# Patient Record
Sex: FEMALE | Race: WHITE | NOT HISPANIC OR LATINO | Employment: OTHER | URBAN - METROPOLITAN AREA
[De-identification: names, ages, dates, MRNs, and addresses within clinical notes are randomized per-mention and may not be internally consistent; named-entity substitution may affect disease eponyms.]

---

## 2023-04-28 ENCOUNTER — TELEPHONE (OUTPATIENT)
Dept: PRIMARY CARE | Facility: CLINIC | Age: 73
End: 2023-04-28
Payer: MEDICARE

## 2023-04-28 NOTE — TELEPHONE ENCOUNTER
----- Message from Amanad Diaz DO sent at 4/24/2023  9:22 AM EDT -----  Let pt know her mammogram was normal.  Repeat in 1 year.

## 2023-05-15 DIAGNOSIS — K30 FUNCTIONAL DYSPEPSIA: ICD-10-CM

## 2023-05-15 RX ORDER — SIMVASTATIN 10 MG/1
TABLET, FILM COATED ORAL
Qty: 90 TABLET | Refills: 1 | Status: SHIPPED | OUTPATIENT
Start: 2023-05-15 | End: 2023-11-08

## 2023-11-08 DIAGNOSIS — K30 FUNCTIONAL DYSPEPSIA: ICD-10-CM

## 2023-11-08 RX ORDER — SIMVASTATIN 10 MG/1
TABLET, FILM COATED ORAL
Qty: 90 TABLET | Refills: 1 | Status: SHIPPED | OUTPATIENT
Start: 2023-11-08 | End: 2024-04-29

## 2024-01-04 ENCOUNTER — OFFICE VISIT (OUTPATIENT)
Dept: ORTHOPEDIC SURGERY | Facility: CLINIC | Age: 74
End: 2024-01-04
Payer: MEDICARE

## 2024-01-04 DIAGNOSIS — M17.0 ARTHRITIS OF BOTH KNEES: Primary | ICD-10-CM

## 2024-01-04 DIAGNOSIS — M25.562 CHRONIC PAIN OF BOTH KNEES: ICD-10-CM

## 2024-01-04 DIAGNOSIS — M25.561 CHRONIC PAIN OF BOTH KNEES: ICD-10-CM

## 2024-01-04 DIAGNOSIS — G89.29 CHRONIC PAIN OF BOTH KNEES: ICD-10-CM

## 2024-01-04 PROCEDURE — 1160F RVW MEDS BY RX/DR IN RCRD: CPT | Performed by: ORTHOPAEDIC SURGERY

## 2024-01-04 PROCEDURE — 1159F MED LIST DOCD IN RCRD: CPT | Performed by: ORTHOPAEDIC SURGERY

## 2024-01-04 PROCEDURE — 99213 OFFICE O/P EST LOW 20 MIN: CPT | Performed by: ORTHOPAEDIC SURGERY

## 2024-01-04 PROCEDURE — 20610 DRAIN/INJ JOINT/BURSA W/O US: CPT | Performed by: ORTHOPAEDIC SURGERY

## 2024-01-04 PROCEDURE — 1036F TOBACCO NON-USER: CPT | Performed by: ORTHOPAEDIC SURGERY

## 2024-01-04 RX ORDER — LIDOCAINE HYDROCHLORIDE 10 MG/ML
2 INJECTION INFILTRATION; PERINEURAL
Status: COMPLETED | OUTPATIENT
Start: 2024-01-04 | End: 2024-01-04

## 2024-01-04 RX ORDER — TRIAMCINOLONE ACETONIDE 40 MG/ML
40 INJECTION, SUSPENSION INTRA-ARTICULAR; INTRAMUSCULAR
Status: COMPLETED | OUTPATIENT
Start: 2024-01-04 | End: 2024-01-04

## 2024-01-04 RX ADMIN — TRIAMCINOLONE ACETONIDE 40 MG: 40 INJECTION, SUSPENSION INTRA-ARTICULAR; INTRAMUSCULAR at 15:29

## 2024-01-04 RX ADMIN — LIDOCAINE HYDROCHLORIDE 2 ML: 10 INJECTION INFILTRATION; PERINEURAL at 15:29

## 2024-01-04 NOTE — PROGRESS NOTES
Subjective    Patient ID: Makenna Kan is a 73 y.o. female.    Chief Complaint: OTHER (F/U BILATERAL KNEES.)       This is a 73-year-old female who has not been seen in this office in over 6 months.  She is known to suffer from arthritis of both the right and the left knee.  The right knee involves the medial compartment mostly in the left knee the lateral compartment.  Also noted is arthritic change of the patellofemoral joint spaces of both knees.  Previous intra-articular Kenalog injections into each knee in June of last year were of significant benefit for 5 months.  Over the past 1 to 2 months she has noted increasing discomfort.  Stiffness after prolonged sitting.  Pain with functions of ADL.  No new injury.    This patient's past medical, social, and family history were reviewed as well as a review of systems including updates on the patient's information encounter sheet    Physical Examination  Constitutional: Patient's height and weight reviewed, appears well kempt  Psychiatric: Alert and oriented ×3, appropriate mood and behavior  Pulmonary: Breathing appears nonlabored, no apparent distress  Lymphatic: No appreciable lymphadenopathy to both the upper and lower extremities  Skin: No open lesions, rashes, ulcerations  Neurologic: Gross motor and sensory exam appear intact (except for abnormalities noted in the below muscle skeletal exam)    Musculoskeletal: There appears to be satisfactory motion of each hip without groin or thigh pain elicited.  Each knee demonstrates patellofemoral crepitus with range of motion.  The left knee has a valgus alignment of 10 degrees that does not correct to varus stress.  Left knee has a lack of extension of 5 degrees and flexion 120 degrees.  Right knee has a varus alignment that does not correct to a valgus stress.  Right knee has range of motion from 0 degrees to 115 degrees.    Previous x-rays were reviewed by myself today.    Assessment: Bilateral knee  arthritis    Plan: An extended discussion ensued with the patient regarding their bilateral knee arthritic condition. Both nonoperative and operative treatment options were discussed. The patient will continue with modifications of activities as well as an exercise program. As the patient desired a intra-articular injection of Kenalog/lidocaine were performed into each knee today under sterile conditions and tolerated well. The patient will observe to see if these injections are benefit and follow up with us on a when necessary basis.    We did discuss the potential options for knee replacement but the patient would like to delay this due to the requirement that she is needed to care for her 4-year-old grandchild.      L Inj/Asp: R knee on 1/4/2024 3:29 PM  Indications: pain  Details: 22 G needle, anterolateral approach  Medications: 40 mg triamcinolone acetonide 40 mg/mL; 2 mL lidocaine 10 mg/mL (1 %)  Consent was given by the patient. Patient was prepped and draped in the usual sterile fashion.       L Inj/Asp: L knee on 1/4/2024 3:29 PM  Indications: pain  Details: 22 G needle, anterolateral approach  Medications: 40 mg triamcinolone acetonide 40 mg/mL; 2 mL lidocaine 10 mg/mL (1 %)  Consent was given by the patient. Patient was prepped and draped in the usual sterile fashion.               Current Outpatient Medications:     simvastatin (Zocor) 10 mg tablet, TAKE 1 TABLET BY MOUTH EVERY DAY IN THE EVENING, Disp: 90 tablet, Rfl: 1

## 2024-02-12 NOTE — PROGRESS NOTES
"Subjective   Reason for Visit: Makenna Kan is an 73 y.o. female here for a Medicare Wellness visit.     Past Medical, Surgical, and Family History reviewed and updated in chart.    Reviewed all medications by prescribing practitioner or clinical pharmacist (such as prescriptions, OTCs, herbal therapies and supplements) and documented in the medical record.    HPI  73 -year-old female presents for medicare wellness visit and f/u      used to see gyn- nithin for exams/mammos- can see her every 2 yrs.  Has appt in march with dr Machado   4/2023 mammo at [facility]- neg     hx of HLD- on simvastatin 10mg.   hx elevated glucose  BMI 34  trying to exercise  trying to eat a healthy diet     DEXA- has not had- defers   stopped Ca+vit D due to leg cramps.      11/2022 [facility]oscopy [facility]. +polys; f/u 5 yrs  hx of reflux/ epigastric discomfort on and off- uses otc antacid prn which helps.   brother with hx of pancreatic cancer which makes her nervous. would like to check amylase/lipase     shingrix- had  prevnar- had  Pneumovax- had   Flu shot- already had   covid- had  RSV- defers      wears glasses and sees optho   She has seen her dentist for regular cleanings      She denies any chest pains, palpitations, edema, shortness of breath, abdominal pains, nausea, vomiting, diarrhea, constipation, blood in stool, melena.      Denies any mole changes.   saw derm in past for skin check  no hx skn CA     sees cardio-deucher annually  had stress test in march 2021- neg per pt  on amlodipine 2.5mg     has 10 grandkids  babysits           /71   Pulse 80   Temp 36.1 °C (96.9 °F)   Ht 1.575 m (5' 2\")   Wt 84.4 kg (186 lb)   BMI 34.02 kg/m²     Patient Self Assessment of Health Status  Patient Self Assessment: Good    Nutrition and Exercise  Current Diet: Well Balanced Diet  Adequate Fluid Intake: Yes  Caffeine: Yes  Exercise Frequency: Infrequently    Functional Ability/Level of Safety  Cognitive Impairment Observed: No " "cognitive impairment observed    Home Safety Risk Factors: None    Patient Care Team:  Amanda Diaz DO as PCP - General  Amanda Diaz DO as PCP - [facility] Attributed Provider     Review of Systems  ROS as stated in HPI    Medicare Wellness Billing Compliance Satisfied    *This is a visual tool to show completion of required items on the day of the visit. Green checks will only appear on the date of visit.      Objective   Vitals:  /71   Pulse 80   Temp 36.1 °C (96.9 °F)   Ht 1.575 m (5' 2\")   Wt 84.4 kg (186 lb)   BMI 34.02 kg/m²       Physical Exam  Constitutional: A&O; NAD  HEENT: conjunctiva normal. EOMI; PERRLA; lids normal; TM's clear;   Neck: supple; No lymphadenopathy   Heart: RRR     Lungs: CTA bilateral   Abd: Soft, Nontender, Nondistended  Ext:  No edema;    Neuro: No gross neuro deficits     Psych: Judgment, orientation, mood, affect, insight wnl   Assessment/Plan   Problem List Items Addressed This Visit       Hyperlipidemia    Relevant Orders    Comprehensive Metabolic Panel    Lipid Panel    Hypertension    Relevant Orders    Comprehensive Metabolic Panel     Other Visit Diagnoses       Medicare annual wellness visit, subsequent    -  Primary    Healthcare maintenance        Epigastric discomfort        Relevant Orders    Amylase    Lipase    Family history of pancreatic cancer        Relevant Orders    Amylase    Lipase    MR pancreas screening self pay    Elevated glucose        Relevant Orders    Comprehensive Metabolic Panel    Hemoglobin A1C    Other fatigue        Relevant Orders    CBC    TSH with reflex to Free T4 if abnormal        Has appt with gyn dr koehler in march 4/2023 mammo will get order from gyn   11/2022 colonoscopy- f/u 5 yrs;   RSV-defers  other immunizations are UTD  pt defers bone density test  healthy lifestyle-diet, exercise.   check fasting labs/amylase  Check self pay MRI pancreas  f/u with cardio   f/u with derm for skin check           "

## 2024-02-15 ENCOUNTER — OFFICE VISIT (OUTPATIENT)
Dept: PRIMARY CARE | Facility: CLINIC | Age: 74
End: 2024-02-15
Payer: MEDICARE

## 2024-02-15 VITALS
HEART RATE: 80 BPM | HEIGHT: 62 IN | TEMPERATURE: 96.9 F | BODY MASS INDEX: 34.23 KG/M2 | WEIGHT: 186 LBS | DIASTOLIC BLOOD PRESSURE: 71 MMHG | SYSTOLIC BLOOD PRESSURE: 127 MMHG

## 2024-02-15 DIAGNOSIS — R53.83 OTHER FATIGUE: ICD-10-CM

## 2024-02-15 DIAGNOSIS — R10.13 EPIGASTRIC DISCOMFORT: ICD-10-CM

## 2024-02-15 DIAGNOSIS — I10 HYPERTENSION, UNSPECIFIED TYPE: ICD-10-CM

## 2024-02-15 DIAGNOSIS — Z80.0 FAMILY HISTORY OF PANCREATIC CANCER: ICD-10-CM

## 2024-02-15 DIAGNOSIS — R73.09 ELEVATED GLUCOSE: ICD-10-CM

## 2024-02-15 DIAGNOSIS — E78.5 HYPERLIPIDEMIA, UNSPECIFIED HYPERLIPIDEMIA TYPE: ICD-10-CM

## 2024-02-15 DIAGNOSIS — Z00.00 MEDICARE ANNUAL WELLNESS VISIT, SUBSEQUENT: Primary | ICD-10-CM

## 2024-02-15 DIAGNOSIS — Z00.00 HEALTHCARE MAINTENANCE: ICD-10-CM

## 2024-02-15 LAB
NON-UH HIE A/G RATIO: 1.3
NON-UH HIE ALB: 4.1 G/DL (ref 3.4–5)
NON-UH HIE ALK PHOS: 104 UNIT/L (ref 45–117)
NON-UH HIE AMYLASE: 69 UNIT/L (ref 30–118)
NON-UH HIE BILIRUBIN, TOTAL: 0.8 MG/DL (ref 0.3–1.2)
NON-UH HIE BUN/CREAT RATIO: 25.7
NON-UH HIE BUN: 18 MG/DL (ref 9–23)
NON-UH HIE CALCIUM: 9.8 MG/DL (ref 8.7–10.4)
NON-UH HIE CALCULATED LDL CHOLESTEROL: 111 MG/DL (ref 60–130)
NON-UH HIE CALCULATED OSMOLALITY: 288 MOSM/KG (ref 275–295)
NON-UH HIE CHLORIDE: 108 MMOL/L (ref 98–107)
NON-UH HIE CHOLESTEROL: 214 MG/DL (ref 100–200)
NON-UH HIE CO2, VENOUS: 29 MMOL/L (ref 20–31)
NON-UH HIE CREATININE: 0.7 MG/DL (ref 0.5–0.8)
NON-UH HIE GFR AA: >60
NON-UH HIE GLOBULIN: 3.2 G/DL
NON-UH HIE GLOMERULAR FILTRATION RATE: >60 ML/MIN/1.73M?
NON-UH HIE GLUCOSE: 91 MG/DL (ref 74–106)
NON-UH HIE GOT: 21 UNIT/L (ref 15–37)
NON-UH HIE GPT: 23 UNIT/L (ref 10–49)
NON-UH HIE HCT: 47.2 % (ref 36–46)
NON-UH HIE HDL CHOLESTEROL: 78 MG/DL (ref 40–60)
NON-UH HIE HGB A1C: 5.4 %
NON-UH HIE HGB: 15.3 G/DL (ref 12–16)
NON-UH HIE INSTR WBC ND: 7.4
NON-UH HIE K: 4.5 MMOL/L (ref 3.5–5.1)
NON-UH HIE LIPASE: 37 UNIT/L (ref 12–53)
NON-UH HIE MCH: 28.8 PG (ref 27–34)
NON-UH HIE MCHC: 32.5 G/DL (ref 32–37)
NON-UH HIE MCV: 88.7 FL (ref 80–100)
NON-UH HIE MPV: 8 FL (ref 7.4–10.4)
NON-UH HIE NA: 144 MMOL/L (ref 135–145)
NON-UH HIE PLATELET: 328 X10 (ref 150–450)
NON-UH HIE RBC: 5.32 X10 (ref 4.2–5.4)
NON-UH HIE RDW: 14.3 % (ref 11.5–14.5)
NON-UH HIE TOTAL CHOL/HDL CHOL RATIO: 2.7
NON-UH HIE TOTAL PROTEIN: 7.3 G/DL (ref 5.7–8.2)
NON-UH HIE TRIGLYCERIDES: 123 MG/DL (ref 30–150)
NON-UH HIE TSH: 2.59 UIU/ML (ref 0.55–4.78)
NON-UH HIE WBC: 7.4 X10 (ref 4.5–11)

## 2024-02-15 PROCEDURE — 1160F RVW MEDS BY RX/DR IN RCRD: CPT | Performed by: FAMILY MEDICINE

## 2024-02-15 PROCEDURE — 1170F FXNL STATUS ASSESSED: CPT | Performed by: FAMILY MEDICINE

## 2024-02-15 PROCEDURE — 1036F TOBACCO NON-USER: CPT | Performed by: FAMILY MEDICINE

## 2024-02-15 PROCEDURE — 3078F DIAST BP <80 MM HG: CPT | Performed by: FAMILY MEDICINE

## 2024-02-15 PROCEDURE — 1159F MED LIST DOCD IN RCRD: CPT | Performed by: FAMILY MEDICINE

## 2024-02-15 PROCEDURE — G0439 PPPS, SUBSEQ VISIT: HCPCS | Performed by: FAMILY MEDICINE

## 2024-02-15 PROCEDURE — 3074F SYST BP LT 130 MM HG: CPT | Performed by: FAMILY MEDICINE

## 2024-02-15 PROCEDURE — 1158F ADVNC CARE PLAN TLK DOCD: CPT | Performed by: FAMILY MEDICINE

## 2024-02-15 PROCEDURE — 1123F ACP DISCUSS/DSCN MKR DOCD: CPT | Performed by: FAMILY MEDICINE

## 2024-02-15 RX ORDER — AMLODIPINE BESYLATE 2.5 MG/1
2.5 TABLET ORAL DAILY
COMMUNITY

## 2024-02-15 ASSESSMENT — ACTIVITIES OF DAILY LIVING (ADL)
TAKING_MEDICATION: INDEPENDENT
DOING_HOUSEWORK: INDEPENDENT
DRESSING: INDEPENDENT
MANAGING_FINANCES: INDEPENDENT
BATHING: INDEPENDENT
GROCERY_SHOPPING: INDEPENDENT

## 2024-02-15 ASSESSMENT — PATIENT HEALTH QUESTIONNAIRE - PHQ9
2. FEELING DOWN, DEPRESSED OR HOPELESS: NOT AT ALL
1. LITTLE INTEREST OR PLEASURE IN DOING THINGS: NOT AT ALL
SUM OF ALL RESPONSES TO PHQ9 QUESTIONS 1 AND 2: 0

## 2024-02-16 ENCOUNTER — TELEPHONE (OUTPATIENT)
Dept: PRIMARY CARE | Facility: CLINIC | Age: 74
End: 2024-02-16
Payer: MEDICARE

## 2024-02-16 NOTE — TELEPHONE ENCOUNTER
----- Message from Amanda Diaz DO sent at 2/16/2024 10:55 AM EST -----  Please let pt know that her pancreatic enzymes, blood counts, sugar, electrolytes, thyroid, liver, and kidney function are all normal. Her cholesterol was slightly elevated.  notify them of all the lipid #s.  I recommend a healthy diet and exercise and we will cont to monitor

## 2024-04-27 DIAGNOSIS — K30 FUNCTIONAL DYSPEPSIA: ICD-10-CM

## 2024-04-29 RX ORDER — SIMVASTATIN 10 MG/1
TABLET, FILM COATED ORAL
Qty: 90 TABLET | Refills: 1 | Status: SHIPPED | OUTPATIENT
Start: 2024-04-29

## 2024-06-25 DIAGNOSIS — M25.561 BILATERAL CHRONIC KNEE PAIN: ICD-10-CM

## 2024-06-25 DIAGNOSIS — M25.562 BILATERAL CHRONIC KNEE PAIN: ICD-10-CM

## 2024-06-25 DIAGNOSIS — G89.29 BILATERAL CHRONIC KNEE PAIN: ICD-10-CM

## 2024-06-27 ENCOUNTER — OFFICE VISIT (OUTPATIENT)
Dept: ORTHOPEDIC SURGERY | Facility: CLINIC | Age: 74
End: 2024-06-27
Payer: MEDICARE

## 2024-06-27 ENCOUNTER — HOSPITAL ENCOUNTER (OUTPATIENT)
Dept: RADIOLOGY | Facility: CLINIC | Age: 74
Discharge: HOME | End: 2024-06-27
Payer: MEDICARE

## 2024-06-27 VITALS — WEIGHT: 185 LBS | BODY MASS INDEX: 34.04 KG/M2 | HEIGHT: 62 IN

## 2024-06-27 DIAGNOSIS — G89.29 BILATERAL CHRONIC KNEE PAIN: ICD-10-CM

## 2024-06-27 DIAGNOSIS — M25.561 BILATERAL CHRONIC KNEE PAIN: ICD-10-CM

## 2024-06-27 DIAGNOSIS — M17.0 ARTHRITIS OF BOTH KNEES: Primary | ICD-10-CM

## 2024-06-27 DIAGNOSIS — G89.29 CHRONIC PAIN OF BOTH KNEES: ICD-10-CM

## 2024-06-27 DIAGNOSIS — M25.561 CHRONIC PAIN OF BOTH KNEES: ICD-10-CM

## 2024-06-27 DIAGNOSIS — M25.562 CHRONIC PAIN OF BOTH KNEES: ICD-10-CM

## 2024-06-27 DIAGNOSIS — M25.562 BILATERAL CHRONIC KNEE PAIN: ICD-10-CM

## 2024-06-27 PROCEDURE — 73562 X-RAY EXAM OF KNEE 3: CPT | Mod: BILATERAL PROCEDURE | Performed by: RADIOLOGY

## 2024-06-27 PROCEDURE — 73562 X-RAY EXAM OF KNEE 3: CPT | Mod: 50

## 2024-06-27 RX ORDER — TRIAMCINOLONE ACETONIDE 40 MG/ML
40 INJECTION, SUSPENSION INTRA-ARTICULAR; INTRAMUSCULAR
Status: COMPLETED | OUTPATIENT
Start: 2024-06-27 | End: 2024-06-27

## 2024-06-27 RX ORDER — LIDOCAINE HYDROCHLORIDE 10 MG/ML
2 INJECTION INFILTRATION; PERINEURAL
Status: COMPLETED | OUTPATIENT
Start: 2024-06-27 | End: 2024-06-27

## 2024-06-27 ASSESSMENT — ENCOUNTER SYMPTOMS
KNEE SWELLING: 1
KNEE DEFORMITY: 1

## 2024-06-27 NOTE — PROGRESS NOTES
"Subjective    Patient ID: Makenna Kan \"Selina\" is a 73 y.o. female.    Chief Complaint: Follow-up of the Left Knee and Follow-up of the Right Knee       This is a 73-year-old female with a long history of arthritis involving both knees.  Intra-articular injections in the past have been of benefit with the last injections nearly 6 months ago.  Presently she is having more pain in the right knee than the left knee.  She has tried rest and modifications as well as attempts at weight reduction without relief.  She has also used ibuprofen.  She feels the recent onset of increasing pain over the past couple weeks is related overactivity is preparing for her grandchild's high school graduation party.  This required her to stand and cook for an extended period of time.    This patient's past medical, social, and family history were reviewed as well as a review of systems including updates on the patient's information encounter sheet  Patient denies history diabetes    Physical Examination  Constitutional: Patient's height and weight reviewed, appears well kempt  Psychiatric: Alert and oriented ×3, appropriate mood and behavior  Pulmonary: Breathing appears nonlabored, no apparent distress  Lymphatic: No appreciable lymphadenopathy to both the upper and lower extremities  Skin: No open lesions, rashes, ulcerations  Neurologic: Gross motor and sensory exam appear intact (except for abnormalities noted in the below muscle skeletal exam)    Musculoskeletal: There is satisfactory range of motion of each hip without groin or thigh pain elicited.  The right knee has a varus alignment of a few degrees that does not correct to valgus stress and the left knee has a valgus alignment of approximately 10 degrees which also does not correct to a varus stress.  There is patellofemoral crepitus in each knee throughout a range of motion somewhat greater on the right knee than the left knee.  No gross ligamentous instability.    X-rays " performed today reveal advanced arthritic changes of each knee with complete obliteration medial joint space of the right knee and advanced narrowing of the left knee lateral joint space.  Each knee has patellofemoral arthritis as well    Assessment: Bilateral knee arthritis    Plan: An extended discussion ensued with the patient regarding their bilateral knee arthritic condition. Both nonoperative and operative treatment options were discussed. The patient will continue with modifications of activities as well as an exercise program. As the patient desired a intra-articular injection of Kenalog/lidocaine were performed into each knee today under sterile conditions and tolerated well. The patient will observe to see if these injections are benefit and follow up with us on a when necessary basis.    Discussed the potential option for knee replacement as well which she will consider in the future    Left Knee     Right Knee     L Inj/Asp: R knee on 6/27/2024 9:55 AM  Indications: pain  Details: 22 G needle, anterolateral approach  Medications: 40 mg triamcinolone acetonide 40 mg/mL; 2 mL lidocaine 10 mg/mL (1 %)  Consent was given by the patient. Patient was prepped and draped in the usual sterile fashion.       L Inj/Asp: L knee on 6/27/2024 9:55 AM  Indications: pain  Details: 22 G needle, anterolateral approach  Medications: 40 mg triamcinolone acetonide 40 mg/mL; 2 mL lidocaine 10 mg/mL (1 %)  Consent was given by the patient. Patient was prepped and draped in the usual sterile fashion.               Current Outpatient Medications:     amLODIPine (Norvasc) 2.5 mg tablet, Take 1 tablet (2.5 mg) by mouth once daily., Disp: , Rfl:     simvastatin (Zocor) 10 mg tablet, TAKE 1 TABLET BY MOUTH EVERY DAY IN THE EVENING, Disp: 90 tablet, Rfl: 1

## 2024-10-20 DIAGNOSIS — K30 FUNCTIONAL DYSPEPSIA: ICD-10-CM

## 2024-10-21 RX ORDER — SIMVASTATIN 10 MG/1
TABLET, FILM COATED ORAL
Qty: 90 TABLET | Refills: 1 | Status: SHIPPED | OUTPATIENT
Start: 2024-10-21

## 2024-11-14 ENCOUNTER — OFFICE VISIT (OUTPATIENT)
Dept: ORTHOPEDIC SURGERY | Facility: CLINIC | Age: 74
End: 2024-11-14
Payer: MEDICARE

## 2024-11-14 DIAGNOSIS — G89.29 CHRONIC PAIN OF BOTH KNEES: ICD-10-CM

## 2024-11-14 DIAGNOSIS — M17.0 ARTHRITIS OF BOTH KNEES: Primary | ICD-10-CM

## 2024-11-14 DIAGNOSIS — M25.561 CHRONIC PAIN OF BOTH KNEES: ICD-10-CM

## 2024-11-14 DIAGNOSIS — M25.562 CHRONIC PAIN OF BOTH KNEES: ICD-10-CM

## 2024-11-14 PROCEDURE — 20610 DRAIN/INJ JOINT/BURSA W/O US: CPT | Mod: LT | Performed by: ORTHOPAEDIC SURGERY

## 2024-11-14 PROCEDURE — 99213 OFFICE O/P EST LOW 20 MIN: CPT | Mod: 50 | Performed by: ORTHOPAEDIC SURGERY

## 2024-11-14 PROCEDURE — 2500000004 HC RX 250 GENERAL PHARMACY W/ HCPCS (ALT 636 FOR OP/ED): Performed by: ORTHOPAEDIC SURGERY

## 2024-11-14 PROCEDURE — 20610 DRAIN/INJ JOINT/BURSA W/O US: CPT | Mod: RT | Performed by: ORTHOPAEDIC SURGERY

## 2024-11-14 RX ORDER — TRIAMCINOLONE ACETONIDE 40 MG/ML
40 INJECTION, SUSPENSION INTRA-ARTICULAR; INTRAMUSCULAR
Status: COMPLETED | OUTPATIENT
Start: 2024-11-14 | End: 2024-11-14

## 2024-11-14 RX ORDER — LIDOCAINE HYDROCHLORIDE 10 MG/ML
2 INJECTION, SOLUTION INFILTRATION; PERINEURAL
Status: COMPLETED | OUTPATIENT
Start: 2024-11-14 | End: 2024-11-14

## 2024-11-14 ASSESSMENT — ENCOUNTER SYMPTOMS
KNEE SWELLING: 1
KNEE DEFORMITY: 1

## 2024-11-14 NOTE — PROGRESS NOTES
"Subjective    Patient ID: Makenna Kan \"Selina\" is a 73 y.o. female.    Chief Complaint: Follow-up of the Left Knee and Follow-up of the Right Knee       This is a 73-year-old female who returns to the office today because of recurrent complaints of bilateral knee pain.  She is known to suffer from advanced arthritis of both the right and the left knee.  Presently the left knee is more symptomatic than the right.  Pain limits functions of ADL including standing, walking and stair climbing.  There has been no new injury.  She has not noted any redness/warmth and denies any fevers or chills.  Previous intra-articular injection of Kenalog into each knee 4-1/2 months ago was of great benefit for nearly 3 months.    This patient's past medical, social, and family history were reviewed as well as a review of systems including updates on the patient's information encounter sheet    Physical Examination  Constitutional: Patient's height and weight reviewed, appears well kempt  Psychiatric: Alert and oriented ×3, appropriate mood and behavior  Pulmonary: Breathing appears nonlabored, no apparent distress  Lymphatic: No appreciable lymphadenopathy to both the upper and lower extremities  Skin: No open lesions, rashes, ulcerations  Neurologic: Gross motor and sensory exam appear intact (except for abnormalities noted in the below muscle skeletal exam)    Musculoskeletal: There appears to be satisfactory range of motion of each hip without groin or thigh pain elicited.  The right knee reveals a mild varus alignment that minimally corrects to valgus stress but the right knee has good flexion to 120 degrees while there is just a lack of extension of 5 degrees.  The left knee has a valgus alignment of approximately 10 to 12 degrees.  This minimally corrects to a varus stress.  The left knee has significant patellofemoral crepitus with range of motion the lack of extension of a few degrees of flexion 115 degrees.  Ambulates in the " office today without the use of an assistive device but does have a slight limp    Previous x-rays were reviewed at length by myself today    Assessment: Bilateral knee arthritis    Plan: An extended discussion ensued with the patient regarding their bilateral knee arthritic condition. Both nonoperative and operative treatment options were discussed. The patient will continue with modifications of activities as well as an exercise program. As the patient desired a intra-articular injection of Kenalog/lidocaine were performed into each knee today under sterile conditions and tolerated well. The patient will observe to see if these injections are benefit and follow up with us on a when necessary basis.    A long discussion ensued prior to the injections regarding knee replacement.  Patient is going to consider this potentially in the spring or summer 2025.  She would like to have this done at [facility] will seek a physician there as she is aware I am retiring in December    Left Knee     Right Knee     L Inj/Asp: R knee on 11/14/2024 10:06 AM  Indications: pain  Details: 22 G needle, anterolateral approach  Medications: 40 mg triamcinolone acetonide 40 mg/mL; 2 mL lidocaine 10 mg/mL (1 %)  Consent was given by the patient. Patient was prepped and draped in the usual sterile fashion.       L Inj/Asp: L knee on 11/14/2024 10:06 AM  Indications: pain  Details: 22 G needle, anterolateral approach  Medications: 40 mg triamcinolone acetonide 40 mg/mL; 2 mL lidocaine 10 mg/mL (1 %)  Consent was given by the patient. Patient was prepped and draped in the usual sterile fashion.               Current Outpatient Medications:     amLODIPine (Norvasc) 2.5 mg tablet, Take 1 tablet (2.5 mg) by mouth once daily., Disp: , Rfl:     simvastatin (Zocor) 10 mg tablet, TAKE 1 TABLET BY MOUTH EVERY DAY IN THE EVENING, Disp: 90 tablet, Rfl: 1

## 2025-02-19 NOTE — PROGRESS NOTES
"Subjective   Reason for Visit: Makenna Kan is an 74 y.o. female here for a Medicare Wellness visit.     Past Medical, Surgical, and Family History reviewed and updated in chart.    Reviewed all medications by prescribing practitioner or clinical pharmacist (such as prescriptions, OTCs, herbal therapies and supplements) and documented in the medical record.    HPI  74 -year-old female presents for medicare wellness visit and f/u      planning left knee replacement in april with dr kincaid    sees gyn- koehler for exams/mammos-saw her march 2024 4/2024 mammo-neg    hx of HLD- on simvastatin 10mg.   hx elevated glucose  BMI 34  trying to eat a healthy diet and stay active     DEXA- had last yr with gyn- was ok per pt  stopped Ca+vit D due to leg cramps.      11/2022 [facility]oscopy [facility]- retired. +polys; f/u 5 yrs  hx of reflux/ epigastric discomfort on and off-brother with hx of pancreatic cancer which makes her nervous. would like to check amylase/lipase     shingrix- had  prevnar- had  Pneumovax- had   Flu shot- already had   covid- had  RSV- defers      wears glasses and sees optho   She has seen her dentist for regular cleanings      She denies any chest pains, palpitations, edema, shortness of breath, abdominal pains, nausea, vomiting, diarrhea, constipation, blood in stool, melena.      sees derm for skin checks  no hx skn CA     sees cardio-deucher annually  had stress test in march 2021- neg per pt  had CT calcium score last yr  on amlodipine 2.5mg         /81   Pulse 95   Temp 36.4 °C (97.6 °F)   Ht 1.575 m (5' 2\")   Wt 86 kg (189 lb 9.6 oz)   BMI 34.68 kg/m²   Patient Self Assessment of Health Status  Patient Self Assessment: Good    Nutrition and Exercise  Current Diet: Well Balanced Diet  Adequate Fluid Intake: Yes  Caffeine: Yes  Exercise Frequency: Infrequently    Functional Ability/Level of Safety  Cognitive Impairment Observed: No cognitive impairment observed    Home Safety Risk " "Factors: None    Patient Care Team:  Amanda Diaz DO as PCP - General  Amanda Diaz DO as PCP - [facility] Attributed Provider  Andrzej Estrada MD as Referring Physician (Orthopaedic Surgery)  Gorge Hines MD as Consulting Physician (Cardiology)  Sarita Steele MD as Referring Physician (Obstetrics and Gynecology)     Review of Systems  ROS as stated in HPI    Medicare Wellness Billing Compliance Satisfied    *This is a visual tool to show completion of required items on the day of the visit. Green checks will only appear on the date of visit.      Objective   Vitals:  /81   Pulse 95   Temp 36.4 °C (97.6 °F)   Ht 1.575 m (5' 2\")   Wt 86 kg (189 lb 9.6 oz)   BMI 34.68 kg/m²       Physical Exam  Constitutional: A&O; NAD  HEENT: conjunctiva normal. EOMI; PERRLA; lids normal; TM's clear;   Neck: supple; No lymphadenopathy   Heart: RRR     Lungs: CTA bilateral   Abd: Soft, Nontender, Nondistended  Ext:  No edema;    Neuro: No gross neuro deficits     Psych: Judgment, orientation, mood, affect, insight wnl   Assessment/Plan   Problem List Items Addressed This Visit       Hyperlipidemia    Relevant Orders    Comprehensive Metabolic Panel    Lipid Panel    Hypertension    Relevant Orders    Comprehensive Metabolic Panel     Other Visit Diagnoses       Medicare annual wellness visit, subsequent    -  Primary    Elevated glucose        Relevant Orders    Comprehensive Metabolic Panel    Hemoglobin A1C    Other fatigue        Relevant Orders    CBC    TSH with reflex to Free T4 if abnormal    Epigastric discomfort        Relevant Orders    Amylase    Lipase    Family history of pancreatic cancer        Relevant Orders    MR pancreas screening self pay          sees gyn for exams/mammos   11/2022 colonoscopy- f/u 5 yrs;   RSV-defers  other immunizations are UTD  4/2024  bone density test with gyn- normal  healthy lifestyle-diet, exercise.   check fasting labs /requests amylase/lipase  considering " self pay MRI pancreas- new order given  sees cardio   sees derm for skin checks  scheduled for left knee replacement- elizabeth  fu 1 yr or sooner if needed

## 2025-02-20 ENCOUNTER — APPOINTMENT (OUTPATIENT)
Dept: PRIMARY CARE | Facility: CLINIC | Age: 75
End: 2025-02-20
Payer: MEDICARE

## 2025-02-20 VITALS
DIASTOLIC BLOOD PRESSURE: 81 MMHG | SYSTOLIC BLOOD PRESSURE: 124 MMHG | WEIGHT: 189.6 LBS | HEART RATE: 95 BPM | HEIGHT: 62 IN | TEMPERATURE: 97.6 F | BODY MASS INDEX: 34.89 KG/M2

## 2025-02-20 DIAGNOSIS — R73.09 ELEVATED GLUCOSE: ICD-10-CM

## 2025-02-20 DIAGNOSIS — Z80.0 FAMILY HISTORY OF PANCREATIC CANCER: ICD-10-CM

## 2025-02-20 DIAGNOSIS — I10 HYPERTENSION, UNSPECIFIED TYPE: ICD-10-CM

## 2025-02-20 DIAGNOSIS — R53.83 OTHER FATIGUE: ICD-10-CM

## 2025-02-20 DIAGNOSIS — R10.13 EPIGASTRIC DISCOMFORT: ICD-10-CM

## 2025-02-20 DIAGNOSIS — Z00.00 MEDICARE ANNUAL WELLNESS VISIT, SUBSEQUENT: Primary | ICD-10-CM

## 2025-02-20 DIAGNOSIS — E78.5 HYPERLIPIDEMIA, UNSPECIFIED HYPERLIPIDEMIA TYPE: ICD-10-CM

## 2025-02-20 LAB
NON-UH HIE A/G RATIO: 1.2
NON-UH HIE ALB: 3.8 G/DL (ref 3.4–5)
NON-UH HIE ALK PHOS: 99 UNIT/L (ref 45–117)
NON-UH HIE AMYLASE: 75 UNIT/L (ref 30–118)
NON-UH HIE BILIRUBIN, TOTAL: 0.8 MG/DL (ref 0.3–1.2)
NON-UH HIE BUN/CREAT RATIO: 35.7
NON-UH HIE BUN: 25 MG/DL (ref 9–23)
NON-UH HIE CALCIUM: 9.6 MG/DL (ref 8.7–10.4)
NON-UH HIE CALCULATED LDL CHOLESTEROL: 98 MG/DL (ref 60–130)
NON-UH HIE CALCULATED OSMOLALITY: 295 MOSM/KG (ref 275–295)
NON-UH HIE CHLORIDE: 110 MMOL/L (ref 98–107)
NON-UH HIE CHOLESTEROL: 196 MG/DL (ref 100–200)
NON-UH HIE CO2, VENOUS: 28 MMOL/L (ref 20–31)
NON-UH HIE CREATININE: 0.7 MG/DL (ref 0.5–0.8)
NON-UH HIE GFR AA: >60
NON-UH HIE GLOBULIN: 3.2 G/DL
NON-UH HIE GLOMERULAR FILTRATION RATE: >60 ML/MIN/1.73M?
NON-UH HIE GLUCOSE: 95 MG/DL (ref 74–106)
NON-UH HIE GOT: 19 UNIT/L (ref 15–37)
NON-UH HIE GPT: 20 UNIT/L (ref 10–49)
NON-UH HIE HCT: 42.4 % (ref 36–46)
NON-UH HIE HDL CHOLESTEROL: 73 MG/DL (ref 40–60)
NON-UH HIE HGB A1C: 5.6 %
NON-UH HIE HGB: 13.9 G/DL (ref 12–16)
NON-UH HIE INSTR WBC ND: 6.5
NON-UH HIE K: 4.2 MMOL/L (ref 3.5–5.1)
NON-UH HIE LIPASE: 42 UNIT/L (ref 12–53)
NON-UH HIE MCH: 29.5 PG (ref 27–34)
NON-UH HIE MCHC: 32.8 G/DL (ref 32–37)
NON-UH HIE MCV: 89.9 FL (ref 80–100)
NON-UH HIE MPV: 7.9 FL (ref 7.4–10.4)
NON-UH HIE NA: 146 MMOL/L (ref 135–145)
NON-UH HIE PLATELET: 333 X10 (ref 150–450)
NON-UH HIE RBC: 4.72 X10 (ref 4.2–5.4)
NON-UH HIE RDW: 14.7 % (ref 11.5–14.5)
NON-UH HIE TOTAL CHOL/HDL CHOL RATIO: 2.7
NON-UH HIE TOTAL PROTEIN: 7 G/DL (ref 5.7–8.2)
NON-UH HIE TRIGLYCERIDES: 126 MG/DL (ref 30–150)
NON-UH HIE TSH: 2.6 UIU/ML (ref 0.55–4.78)
NON-UH HIE WBC: 6.5 X10 (ref 4.5–11)

## 2025-02-20 PROCEDURE — 1170F FXNL STATUS ASSESSED: CPT | Performed by: FAMILY MEDICINE

## 2025-02-20 PROCEDURE — 1159F MED LIST DOCD IN RCRD: CPT | Performed by: FAMILY MEDICINE

## 2025-02-20 PROCEDURE — 1160F RVW MEDS BY RX/DR IN RCRD: CPT | Performed by: FAMILY MEDICINE

## 2025-02-20 PROCEDURE — 1036F TOBACCO NON-USER: CPT | Performed by: FAMILY MEDICINE

## 2025-02-20 PROCEDURE — 3079F DIAST BP 80-89 MM HG: CPT | Performed by: FAMILY MEDICINE

## 2025-02-20 PROCEDURE — 3074F SYST BP LT 130 MM HG: CPT | Performed by: FAMILY MEDICINE

## 2025-02-20 PROCEDURE — 3008F BODY MASS INDEX DOCD: CPT | Performed by: FAMILY MEDICINE

## 2025-02-20 PROCEDURE — G0439 PPPS, SUBSEQ VISIT: HCPCS | Performed by: FAMILY MEDICINE

## 2025-02-20 PROCEDURE — 1123F ACP DISCUSS/DSCN MKR DOCD: CPT | Performed by: FAMILY MEDICINE

## 2025-02-20 ASSESSMENT — ACTIVITIES OF DAILY LIVING (ADL)
BATHING: INDEPENDENT
MANAGING_FINANCES: INDEPENDENT
TAKING_MEDICATION: INDEPENDENT
GROCERY_SHOPPING: INDEPENDENT
DOING_HOUSEWORK: INDEPENDENT
DRESSING: INDEPENDENT

## 2025-02-24 ENCOUNTER — TELEPHONE (OUTPATIENT)
Dept: PRIMARY CARE | Facility: CLINIC | Age: 75
End: 2025-02-24
Payer: MEDICARE

## 2025-02-24 NOTE — TELEPHONE ENCOUNTER
----- Message from Amanda Diaz sent at 2/24/2025  9:49 AM EST -----  Please let pt know that her blood counts, sugar, pancreatic enzymes, thyroid, liver, and kidney function are all normal and her  cholesterol was good.   Her sodium level was slightly elevated at 146(should be less than 145).  We will continue to monitor.  ----- Message -----  From: Mouna Lee CMA  Sent: 2/24/2025   8:43 AM EST  To: Amanda Diaz,

## 2025-04-08 LAB
NON-UH HIE APPEARANCE, U: ABNORMAL
NON-UH HIE APTT PATIENT: 27.8 SECONDS (ref 26–36)
NON-UH HIE BACTERIA, U: ABNORMAL
NON-UH HIE BILIRUBIN, U: ABNORMAL
NON-UH HIE BLOOD, U: ABNORMAL
NON-UH HIE BUN/CREAT RATIO: 33.3
NON-UH HIE BUN: 20 MG/DL (ref 9–23)
NON-UH HIE CALCIUM: 9.6 MG/DL (ref 8.7–10.4)
NON-UH HIE CALCULATED OSMOLALITY: 290 MOSM/KG (ref 275–295)
NON-UH HIE CHLORIDE: 110 MMOL/L (ref 98–107)
NON-UH HIE CO2, VENOUS: 26 MMOL/L (ref 20–31)
NON-UH HIE COLOR, U: ABNORMAL
NON-UH HIE CREATININE: 0.6 MG/DL (ref 0.5–0.8)
NON-UH HIE GFR AA: >60
NON-UH HIE GLOMERULAR FILTRATION RATE: >60 ML/MIN/1.73M?
NON-UH HIE GLUCOSE QUAL, U: ABNORMAL
NON-UH HIE GLUCOSE: 103 MG/DL (ref 74–106)
NON-UH HIE HCT: 42 % (ref 36–46)
NON-UH HIE HGB: 14 G/DL (ref 12–16)
NON-UH HIE INR: 0.9
NON-UH HIE INSTR WBC ND: 5.6
NON-UH HIE K: 3.9 MMOL/L (ref 3.5–5.1)
NON-UH HIE KETONES, U: ABNORMAL
NON-UH HIE LEUKOCYTE ESTERASE, U: ABNORMAL
NON-UH HIE MCH: 29.2 PG (ref 27–34)
NON-UH HIE MCHC: 33.2 G/DL (ref 32–37)
NON-UH HIE MCV: 87.9 FL (ref 80–100)
NON-UH HIE MPV: 8.9 FL (ref 7.4–10.4)
NON-UH HIE MUCOUS, U: ABNORMAL
NON-UH HIE NA: 144 MMOL/L (ref 135–145)
NON-UH HIE NITRITE, U: ABNORMAL
NON-UH HIE NON-SQUAMOUS EPITHELIAL, U: 2 #/HPF
NON-UH HIE PH, U: 6 (ref 4.5–8)
NON-UH HIE PLATELET: 227 X10 (ref 150–450)
NON-UH HIE PROTEIN, U: ABNORMAL
NON-UH HIE PROTIME PATIENT: 9.7 SECONDS (ref 9.8–12.4)
NON-UH HIE RBC/HPF, U: 4 #/HPF (ref 0–3)
NON-UH HIE RBC: 4.78 X10 (ref 4.2–5.4)
NON-UH HIE RDW: 13.4 % (ref 11.5–14.5)
NON-UH HIE SPECIFIC GRAVITY, U: 1.03 (ref 1–1.03)
NON-UH HIE SQUAMOUS EPITHELIAL CELLS, U: 15 #/HPF
NON-UH HIE U MICRO: ABNORMAL
NON-UH HIE UROBILINOGEN QUAL, U: ABNORMAL
NON-UH HIE WBC/HPF, U: 18 #/HPF (ref 0–5)
NON-UH HIE WBC: 5.6 X10 (ref 4.5–11)

## 2025-08-26 ENCOUNTER — APPOINTMENT (OUTPATIENT)
Dept: PRIMARY CARE | Facility: CLINIC | Age: 75
End: 2025-08-26
Payer: MEDICARE

## 2026-02-26 ENCOUNTER — APPOINTMENT (OUTPATIENT)
Dept: PRIMARY CARE | Facility: CLINIC | Age: 76
End: 2026-02-26
Payer: MEDICARE